# Patient Record
Sex: FEMALE | Race: OTHER
[De-identification: names, ages, dates, MRNs, and addresses within clinical notes are randomized per-mention and may not be internally consistent; named-entity substitution may affect disease eponyms.]

---

## 2017-02-18 ENCOUNTER — HOSPITAL ENCOUNTER (EMERGENCY)
Dept: HOSPITAL 25 - ED | Age: 52
Discharge: HOME | End: 2017-02-18
Payer: COMMERCIAL

## 2017-02-18 DIAGNOSIS — N39.0: Primary | ICD-10-CM

## 2017-02-18 LAB
ALBUMIN SERPL BCG-MCNC: 4.6 G/DL (ref 3.2–5.2)
ALP SERPL-CCNC: 68 U/L (ref 34–104)
ALT SERPL W P-5'-P-CCNC: 12 U/L (ref 7–52)
ANION GAP SERPL CALC-SCNC: 5 MMOL/L (ref 2–11)
AST SERPL-CCNC: 18 U/L (ref 13–39)
BUN SERPL-MCNC: 10 MG/DL (ref 6–24)
BUN/CREAT SERPL: 16.1 (ref 8–20)
CALCIUM SERPL-MCNC: 9.9 MG/DL (ref 8.6–10.3)
CHLORIDE SERPL-SCNC: 103 MMOL/L (ref 101–111)
GLOBULIN SER CALC-MCNC: 3.2 G/DL (ref 2–4)
GLUCOSE SERPL-MCNC: 96 MG/DL (ref 70–100)
HCO3 SERPL-SCNC: 27 MMOL/L (ref 22–32)
HCT VFR BLD AUTO: 37 % (ref 35–47)
HGB BLD-MCNC: 13 G/DL (ref 12–16)
LIPASE SERPL-CCNC: 37 U/L (ref 11–82)
MCH RBC QN AUTO: 30 PG (ref 27–31)
MCHC RBC AUTO-ENTMCNC: 35 G/DL (ref 31–36)
MCV RBC AUTO: 85 FL (ref 80–97)
POTASSIUM SERPL-SCNC: 3.7 MMOL/L (ref 3.5–5)
PROT SERPL-MCNC: 7.8 G/DL (ref 6.4–8.9)
RBC # BLD AUTO: 4.38 10^6/UL (ref 4–5.4)
SODIUM SERPL-SCNC: 135 MMOL/L (ref 133–145)
WBC # BLD AUTO: 9.6 10^3/UL (ref 3.5–10.8)

## 2017-02-18 PROCEDURE — 86140 C-REACTIVE PROTEIN: CPT

## 2017-02-18 PROCEDURE — 85730 THROMBOPLASTIN TIME PARTIAL: CPT

## 2017-02-18 PROCEDURE — 85025 COMPLETE CBC W/AUTO DIFF WBC: CPT

## 2017-02-18 PROCEDURE — 81015 MICROSCOPIC EXAM OF URINE: CPT

## 2017-02-18 PROCEDURE — 87086 URINE CULTURE/COLONY COUNT: CPT

## 2017-02-18 PROCEDURE — 36415 COLL VENOUS BLD VENIPUNCTURE: CPT

## 2017-02-18 PROCEDURE — 96360 HYDRATION IV INFUSION INIT: CPT

## 2017-02-18 PROCEDURE — 96375 TX/PRO/DX INJ NEW DRUG ADDON: CPT

## 2017-02-18 PROCEDURE — 99282 EMERGENCY DEPT VISIT SF MDM: CPT

## 2017-02-18 PROCEDURE — 81003 URINALYSIS AUTO W/O SCOPE: CPT

## 2017-02-18 PROCEDURE — 83690 ASSAY OF LIPASE: CPT

## 2017-02-18 PROCEDURE — 74176 CT ABD & PELVIS W/O CONTRAST: CPT

## 2017-02-18 PROCEDURE — 83605 ASSAY OF LACTIC ACID: CPT

## 2017-02-18 PROCEDURE — 96374 THER/PROPH/DIAG INJ IV PUSH: CPT

## 2017-02-18 PROCEDURE — 85610 PROTHROMBIN TIME: CPT

## 2017-02-18 PROCEDURE — 80053 COMPREHEN METABOLIC PANEL: CPT

## 2017-02-18 NOTE — ED
Dickson CLAROS Salem, scribed for Dominguez Laurent MD on 02/18/17 at 1131 .





GI/ HPI





- HPI Summary


HPI Summary: 


Patient is a 50 y/o female who presents to the ED for sudden onset of 

suprapubic pain since 2 days. She reports dysuria, pain in the lower back, and 

pain in the anus. She states sx are similar to past episodes of UTI. She denies 

fever or chills and states pain is mildly alleviated by bending forward. She 

denies a SHx of hysterectomy or a Hx of renal calculi, but she did have an 

appendectomy 32 years ago. 





- History of Current Complaint


Chief Complaint: EDUrogenitalProblems


Stated Complaint: FLANK PAIN


Hx Obtained From: Patient


Onset/Duration: Started Days Ago, Still Present


Severity: Moderate


Current Severity: Moderate


Pain Intensity: 10


Location of Pain: Suprapubic, Flank, Anal


Associated Signs and Symptoms: Positive: Back Pain - Lower., Dysuria, Other: - 

No chills..  Negative: Fever


Aggravating Factor(s): Nothing


Alleviating Factor(s): Position - Bending forward.





- Allergy/Home Medications


Allergies/Adverse Reactions: 


 Allergies











Allergy/AdvReac Type Severity Reaction Status Date / Time


 


No Known Allergies Allergy   Verified 02/18/17 13:24














PMH/Surg Hx/FS Hx/Imm Hx


Endocrine/Hematology History: Reports: Hx Thyroid Disease


 History: Reports: Other  Problems/Disorders - Past UTI.


   Denies: Hx Kidney Stones





- Surgical History


Surgery Procedure, Year, and Place: Appendectomy.


Infectious Disease History: No


Infectious Disease History: 


   Denies: Traveled Outside the [place] in Last 30 Days





- Family History


Known Family History: Positive: Cardiac Disease - Father. , Other - Thyroid dz 

- father.


   Negative: Diabetes





- Social History


Alcohol Use: None


Substance Use Type: Reports: None


Hx Tobacco Use: No





Review of Systems


Negative: Fever, Chills


Positive: dysuria, flank pain, pain - Suprapubic and lower back.


All Other Systems Reviewed And Are Negative: Yes





Physical Exam


Triage Information Reviewed: Yes


Vital Signs On Initial Exam: 


 Initial Vitals











Temp Pulse Resp BP Pulse Ox


 


 97.4 F   96   20   112/91   100 


 


 02/18/17 10:32  02/18/17 10:32  02/18/17 10:32  02/18/17 10:32  02/18/17 10:32











Vital Signs Reviewed: Yes


Appearance: Positive: Well-Appearing, Pain Distress - Moderate.


Skin: Positive: Warm, Skin Color Reflects Adequate Perfusion, Dry


Head/Face: Positive: Normal Head/Face Inspection


Eyes: Positive: EOMI, OG


ENT: Positive: Normal ENT inspection


Neck: Positive: Supple, Nontender


Respiratory/Lung Sounds: Positive: Clear to Auscultation, Breath Sounds Present


Cardiovascular: Positive: RRR


Abdomen Description: Positive: Nontender, Soft


Pelvic Exam: Positive: other - Suprapubic tenderness.


Musculoskeletal: Positive: Normal, Strength/ROM Intact


Neurological: Positive: Normal, Sensory/Motor Intact, Alert, Oriented to Person 

Place, Time


Psychiatric: Positive: Affect/Mood Appropriate





Diagnostics





- Vital Signs


 Vital Signs











  Temp Pulse Resp BP Pulse Ox


 


 02/18/17 10:32  97.4 F  96  20  112/91  100














- Laboratory


Lab Results: 


 Lab Results











  02/18/17 02/18/17 02/18/17 Range/Units





  10:51 11:45 11:45 


 


WBC   9.6   (3.5-10.8)  10^3/ul


 


RBC   4.38   (4.0-5.4)  10^6/ul


 


Hgb   13.0   (12.0-16.0)  g/dl


 


Hct   37   (35-47)  %


 


MCV   85   (80-97)  fL


 


MCH   30   (27-31)  pg


 


MCHC   35   (31-36)  g/dl


 


RDW   12   (10.5-15)  %


 


Plt Count   191   (150-450)  10^3/ul


 


MPV   10   (7.4-10.4)  um3


 


Neut % (Auto)   79.4   (38-83)  %


 


Lymph % (Auto)   15.2 L   (25-47)  %


 


Mono % (Auto)   5.0   (1-9)  %


 


Eos % (Auto)   0.3   (0-6)  %


 


Baso % (Auto)   0.1   (0-2)  %


 


Absolute Neuts (auto)   7.6   (1.5-7.7)  10^3/ul


 


Absolute Lymphs (auto)   1.5   (1.0-4.8)  10^3/ul


 


Absolute Monos (auto)   0.5   (0-0.8)  10^3/ul


 


Absolute Eos (auto)   0   (0-0.6)  10^3/ul


 


Absolute Basos (auto)   0   (0-0.2)  10^3/ul


 


Absolute Nucleated RBC   0   10^3/ul


 


Nucleated RBC %   0   


 


INR (Anticoag Therapy)    1.01  (0.89-1.11)  


 


APTT    30.6  (26.0-36.3)  seconds


 


Sodium     (133-145)  mmol/L


 


Potassium     (3.5-5.0)  mmol/L


 


Chloride     (101-111)  mmol/L


 


Carbon Dioxide     (22-32)  mmol/L


 


Anion Gap     (2-11)  mmol/L


 


BUN     (6-24)  mg/dL


 


Creatinine     (0.51-0.95)  mg/dL


 


Est GFR ( Amer)     (>60)  


 


Est GFR (Non-Af Amer)     (>60)  


 


BUN/Creatinine Ratio     (8-20)  


 


Glucose     ()  mg/dL


 


Lactic Acid     (0.5-2.0)  mmol/L


 


Calcium     (8.6-10.3)  mg/dL


 


Total Bilirubin     (0.2-1.0)  mg/dL


 


AST     (13-39)  U/L


 


ALT     (7-52)  U/L


 


Alkaline Phosphatase     ()  U/L


 


C-Reactive Protein     (< 5.00)  mg/L


 


Total Protein     (6.4-8.9)  g/dL


 


Albumin     (3.2-5.2)  g/dL


 


Globulin     (2-4)  g/dL


 


Albumin/Globulin Ratio     (1-3)  


 


Lipase     (11.0-82.0)  U/L


 


Urine Color  Pia    


 


Urine Appearance  Clear    


 


Urine pH  8.0    (5-9)  


 


Ur Specific Gravity  1.003 L    (1.010-1.030)  


 


Urine Protein  Negative    (Negative)  


 


Urine Ketones  Negative    (Negative)  


 


Urine Blood  3+ H    (Negative)  


 


Urine Nitrate  Positive H    (Negative)  


 


Urine Bilirubin  Negative    (Negative)  


 


Urine Urobilinogen  Negative    (Negative)  


 


Ur Leukocyte Esterase  3+ H    (Negative)  


 


Urine WBC (Auto)  3+(>20/hpf) H    (Absent)  


 


Urine RBC (Auto)  1+(3-5/hpf) H    (Absent)  


 


Ur Squamous Epith Cells  Present H    (Absent)  


 


Urine Bacteria  1+ H    (Absent)  


 


Urine Glucose  Negative    (Negative)  














  02/18/17 02/18/17 Range/Units





  11:45 11:45 


 


WBC    (3.5-10.8)  10^3/ul


 


RBC    (4.0-5.4)  10^6/ul


 


Hgb    (12.0-16.0)  g/dl


 


Hct    (35-47)  %


 


MCV    (80-97)  fL


 


MCH    (27-31)  pg


 


MCHC    (31-36)  g/dl


 


RDW    (10.5-15)  %


 


Plt Count    (150-450)  10^3/ul


 


MPV    (7.4-10.4)  um3


 


Neut % (Auto)    (38-83)  %


 


Lymph % (Auto)    (25-47)  %


 


Mono % (Auto)    (1-9)  %


 


Eos % (Auto)    (0-6)  %


 


Baso % (Auto)    (0-2)  %


 


Absolute Neuts (auto)    (1.5-7.7)  10^3/ul


 


Absolute Lymphs (auto)    (1.0-4.8)  10^3/ul


 


Absolute Monos (auto)    (0-0.8)  10^3/ul


 


Absolute Eos (auto)    (0-0.6)  10^3/ul


 


Absolute Basos (auto)    (0-0.2)  10^3/ul


 


Absolute Nucleated RBC    10^3/ul


 


Nucleated RBC %    


 


INR (Anticoag Therapy)    (0.89-1.11)  


 


APTT    (26.0-36.3)  seconds


 


Sodium  135   (133-145)  mmol/L


 


Potassium  3.7   (3.5-5.0)  mmol/L


 


Chloride  103   (101-111)  mmol/L


 


Carbon Dioxide  27   (22-32)  mmol/L


 


Anion Gap  5   (2-11)  mmol/L


 


BUN  10   (6-24)  mg/dL


 


Creatinine  0.62   (0.51-0.95)  mg/dL


 


Est GFR ( Amer)  130.0   (>60)  


 


Est GFR (Non-Af Amer)  101.1   (>60)  


 


BUN/Creatinine Ratio  16.1   (8-20)  


 


Glucose  96   ()  mg/dL


 


Lactic Acid   0.7  (0.5-2.0)  mmol/L


 


Calcium  9.9   (8.6-10.3)  mg/dL


 


Total Bilirubin  0.50   (0.2-1.0)  mg/dL


 


AST  18   (13-39)  U/L


 


ALT  12   (7-52)  U/L


 


Alkaline Phosphatase  68   ()  U/L


 


C-Reactive Protein  3.13   (< 5.00)  mg/L


 


Total Protein  7.8   (6.4-8.9)  g/dL


 


Albumin  4.6   (3.2-5.2)  g/dL


 


Globulin  3.2   (2-4)  g/dL


 


Albumin/Globulin Ratio  1.4   (1-3)  


 


Lipase  37   (11.0-82.0)  U/L


 


Urine Color    


 


Urine Appearance    


 


Urine pH    (5-9)  


 


Ur Specific Gravity    (1.010-1.030)  


 


Urine Protein    (Negative)  


 


Urine Ketones    (Negative)  


 


Urine Blood    (Negative)  


 


Urine Nitrate    (Negative)  


 


Urine Bilirubin    (Negative)  


 


Urine Urobilinogen    (Negative)  


 


Ur Leukocyte Esterase    (Negative)  


 


Urine WBC (Auto)    (Absent)  


 


Urine RBC (Auto)    (Absent)  


 


Ur Squamous Epith Cells    (Absent)  


 


Urine Bacteria    (Absent)  


 


Urine Glucose    (Negative)  











Result Diagrams: 


 02/18/17 11:45





 02/18/17 11:45


Lab Statement: Any lab studies that have been ordered have been reviewed, and 

results considered in the medical decision making process.





- CT


  ** ABD/PELVIS


CT Interpretation Completed By: Radiologist - IMPRESSION: NO HYDRONEPHROSIS OR 

NEPHROLITHIASIS. NO ACUTE CT PATHOLOGY OF THE VISUALIZED ABDOMEN OR PELVIS





GIGU Course/Dx





- Course


Assessment/Plan: DISCUSSED RESULTS  WITH PATIENT. DISCHARGE HOME STABLE.





- Diagnoses


Provider Diagnoses: 


 UTI (urinary tract infection)








Discharge





- Discharge Plan


Condition: Stable


Disposition: HOME


Prescriptions: 


Sulfamethox/Trimethoprim DS* [Bactrim /160 TAB*] 1 tab PO BID #14 tab


Patient Education Materials:  Urinary Tract Infection in Women (ED)


Referrals: 


[facility]FERR[facility] [Outside]


Additional Instructions: 


FOLLOW UP WITH YOUR DOCTOR.


RETURN TO THE EMERGENCY DEPARTMENT FOR ANY WORSENING OF YOUR CONDITION; PAIN, 

FEVER, YOU FEEL ILL OR QUESTIONS OR CONCERNS.





The documentation as recorded by the Dickson leroy Salem accurately reflects 

the service I personally performed and the decisions made by me, Dominguez Laurent MD.

## 2017-02-18 NOTE — RAD
CLINICAL HISTORY: Flank pain, dysuria



COMPARISON: None



TECHNIQUE: Multiple contiguous axial CT scans were obtained of the abdomen and pelvis,

without intravenous contrast enhancement. Coronal and sagittal multiplanar reformations

are submitted for review.  Oral contrast was not administered.     



FINDINGS: 



The study is limited by the lack of intravenous contrast. This limits evaluation of the

solid organs and vasculature.





LUNG BASES: The lung bases are clear.



LIVER: The liver is normal in shape, size, contour, and attenuation.

BILE DUCTS: There is no intrahepatic or extrahepatic biliary dilatation.

GALLBLADDER: The gallbladder is normal, without pericholecystic inflammatory change.



PANCREAS: The pancreas is normal, without mass or ductal dilatation.

SPLEEN: Normal in size and appearance.



UPPER GI TRACT: Evaluation of the gastrointestinal tract is limited by incomplete gastric

distention. The upper GI tract is unremarkable.

SMALL BOWEL AND MESENTERY: The small bowel is normal in contour, course, and caliber.

There is no obstruction or dilatation.

COLON: The colon is normal in contour, course, caliber. There is no pericolonic

inflammatory change.



ADRENALS: Normal bilaterally.

KIDNEYS: The kidneys are normal in shape, size, contour, and axis. There is no

hydronephrosis or nephrolithiasis.

BLADDER: The bladder is smooth in contour.



PELVIC ORGANS: The uterus and adnexa are grossly normal for technique.



AORTA: The aorta is normal.

IVC: Unremarkable



LYMPH NODES: There is no lymphadenopathy by size criteria.



ABDOMINAL WALL: There is a small fat-containing umbilical hernia

BONES AND SOFT TISSUES: Unremarkable

OTHER: None



IMPRESSION:

NO HYDRONEPHROSIS OR NEPHROLITHIASIS. NO ACUTE CT PATHOLOGY OF THE VISUALIZED ABDOMEN OR

PELVIS

## 2018-12-09 ENCOUNTER — HOSPITAL ENCOUNTER (EMERGENCY)
Dept: HOSPITAL 25 - ED | Age: 53
Discharge: HOME | End: 2018-12-09
Payer: COMMERCIAL

## 2018-12-09 VITALS — DIASTOLIC BLOOD PRESSURE: 70 MMHG | SYSTOLIC BLOOD PRESSURE: 121 MMHG
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
## 2018-12-09 DIAGNOSIS — S33.5XXA: ICD-10-CM

## 2018-12-09 DIAGNOSIS — E07.9: ICD-10-CM
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
## 2018-12-09 DIAGNOSIS — S13.4XXA: Primary | ICD-10-CM

## 2018-12-09 LAB
BASOPHILS # BLD AUTO: 0 10^3/UL (ref 0–0.2)
EOSINOPHIL # BLD AUTO: 0 10^3/UL (ref 0–0.6)
HCT VFR BLD AUTO: 34 % (ref 35–47)
HGB BLD-MCNC: 11.8 G/DL (ref 12–16)
LYMPHOCYTES # BLD AUTO: 1.4 10^3/UL (ref 1–4.8)
MCH RBC QN AUTO: 29 PG (ref 27–31)
MCHC RBC AUTO-ENTMCNC: 35 G/DL (ref 31–36)
MCV RBC AUTO: 84 FL (ref 80–97)
MONOCYTES # BLD AUTO: 0.3 10^3/UL (ref 0–0.8)
NEUTROPHILS # BLD AUTO: 3.3 10^3/UL (ref 1.5–7.7)
NRBC # BLD AUTO: 0 10^3/UL
NRBC BLD QL AUTO: 0
PLATELET # BLD AUTO: 216 10^3/UL (ref 150–450)
RBC # BLD AUTO: 4.06 10^6/UL (ref 4–5.4)
WBC # BLD AUTO: 5.1 10^3/UL (ref 3.5–10.8)

## 2018-12-09 PROCEDURE — 36415 COLL VENOUS BLD VENIPUNCTURE: CPT

## 2018-12-09 PROCEDURE — 96374 THER/PROPH/DIAG INJ IV PUSH: CPT

## 2018-12-09 PROCEDURE — 80053 COMPREHEN METABOLIC PANEL: CPT

## 2018-12-09 PROCEDURE — 99283 EMERGENCY DEPT VISIT LOW MDM: CPT

## 2018-12-09 PROCEDURE — 72125 CT NECK SPINE W/O DYE: CPT

## 2018-12-09 PROCEDURE — 85025 COMPLETE CBC W/AUTO DIFF WBC: CPT

## 2018-12-09 PROCEDURE — 72131 CT LUMBAR SPINE W/O DYE: CPT

## 2018-12-09 PROCEDURE — 71260 CT THORAX DX C+: CPT
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
